# Patient Record
Sex: FEMALE | Race: WHITE | Employment: FULL TIME | URBAN - METROPOLITAN AREA
[De-identification: names, ages, dates, MRNs, and addresses within clinical notes are randomized per-mention and may not be internally consistent; named-entity substitution may affect disease eponyms.]

---

## 2017-01-11 ENCOUNTER — TELEPHONE (OUTPATIENT)
Dept: FAMILY MEDICINE CLINIC | Age: 52
End: 2017-01-11

## 2017-03-21 RX ORDER — ESTRADIOL 0.05 MG/D
PATCH TRANSDERMAL
Qty: 12 PATCH | Refills: 0 | Status: SHIPPED | OUTPATIENT
Start: 2017-03-21 | End: 2017-04-12 | Stop reason: SDUPTHER

## 2017-04-12 DIAGNOSIS — Z12.39 BREAST CANCER SCREENING: Primary | ICD-10-CM

## 2017-04-12 RX ORDER — NICOTINE POLACRILEX 4 MG
GUM BUCCAL
Qty: 30 TABLET | Refills: 5 | Status: SHIPPED | OUTPATIENT
Start: 2017-04-12

## 2017-04-12 RX ORDER — ACETAMINOPHEN 160 MG
TABLET,DISINTEGRATING ORAL
Qty: 30 CAPSULE | Refills: 5 | Status: SHIPPED | OUTPATIENT
Start: 2017-04-12

## 2017-04-12 RX ORDER — ESTRADIOL 0.05 MG/D
PATCH TRANSDERMAL
Qty: 12 PATCH | Refills: 0 | Status: SHIPPED | OUTPATIENT
Start: 2017-04-12 | End: 2018-07-30

## 2017-07-17 ENCOUNTER — TELEPHONE (OUTPATIENT)
Dept: FAMILY MEDICINE CLINIC | Age: 52
End: 2017-07-17

## 2017-08-29 ENCOUNTER — TELEPHONE (OUTPATIENT)
Dept: FAMILY MEDICINE CLINIC | Age: 52
End: 2017-08-29

## 2018-07-20 ENCOUNTER — HOSPITAL ENCOUNTER (OUTPATIENT)
Dept: WOMENS IMAGING | Age: 53
Discharge: HOME | End: 2018-07-20
Attending: FAMILY MEDICINE | Admitting: FAMILY MEDICINE

## 2018-07-20 DIAGNOSIS — Z12.31 VISIT FOR SCREENING MAMMOGRAM: ICD-10-CM

## 2018-07-30 ENCOUNTER — TELEPHONE (OUTPATIENT)
Dept: FAMILY MEDICINE CLINIC | Age: 53
End: 2018-07-30

## 2018-07-30 ENCOUNTER — OFFICE VISIT (OUTPATIENT)
Dept: FAMILY MEDICINE CLINIC | Age: 53
End: 2018-07-30

## 2018-07-30 VITALS
HEART RATE: 83 BPM | BODY MASS INDEX: 29.92 KG/M2 | WEIGHT: 179.6 LBS | OXYGEN SATURATION: 97 % | HEIGHT: 65 IN | DIASTOLIC BLOOD PRESSURE: 82 MMHG | SYSTOLIC BLOOD PRESSURE: 140 MMHG

## 2018-07-30 DIAGNOSIS — F41.9 ANXIETY: Primary | ICD-10-CM

## 2018-07-30 DIAGNOSIS — F41.9 ANXIETY: ICD-10-CM

## 2018-07-30 PROCEDURE — 99213 OFFICE O/P EST LOW 20 MIN: CPT | Performed by: FAMILY MEDICINE

## 2018-07-30 RX ORDER — LORAZEPAM 0.5 MG/1
0.5 TABLET ORAL 2 TIMES DAILY PRN
Qty: 60 TABLET | Refills: 2 | Status: SHIPPED | OUTPATIENT
Start: 2018-07-30 | End: 2018-07-31 | Stop reason: SDUPTHER

## 2018-07-30 ASSESSMENT — ENCOUNTER SYMPTOMS
WHEEZING: 0
CHEST TIGHTNESS: 0
COUGH: 0
SHORTNESS OF BREATH: 1

## 2018-07-30 ASSESSMENT — PATIENT HEALTH QUESTIONNAIRE - PHQ9
SUM OF ALL RESPONSES TO PHQ QUESTIONS 1-9: 0
2. FEELING DOWN, DEPRESSED OR HOPELESS: 0
1. LITTLE INTEREST OR PLEASURE IN DOING THINGS: 0
SUM OF ALL RESPONSES TO PHQ9 QUESTIONS 1 & 2: 0

## 2018-07-30 NOTE — PROGRESS NOTES
She appears well-developed and well-nourished. No distress. HENT:   Head: Normocephalic and atraumatic. Mouth/Throat: No oropharyngeal exudate. Cardiovascular: Normal rate, regular rhythm and normal heart sounds. No murmur heard. Pulmonary/Chest: Effort normal and breath sounds normal. She has no wheezes. Musculoskeletal: Normal range of motion. She exhibits no edema. Neurological: She is alert and oriented to person, place, and time. No cranial nerve deficit. She exhibits normal muscle tone. Coordination normal.   Skin: She is not diaphoretic. Psychiatric: She has a normal mood and affect. Her behavior is normal.   Was crying       ASSESSMENT/ PLAN:    1. Anxiety  - Start:  - LORazepam (ATIVAN) 0.5 MG tablet; Take 1 tablet by mouth 2 times daily as needed for Anxiety for up to 30 days. .  Dispense: 60 tablet; Refill: 2  - keep the time occupied                - Appropriate prescription are addressed. - After visit summery provided. - Questions answered and patient verbalizes understanding.  - Call for any problem, questions, or concerns. Return if symptoms worsen or fail to improve.

## 2018-07-31 RX ORDER — LORAZEPAM 0.5 MG/1
0.5 TABLET ORAL 2 TIMES DAILY PRN
Qty: 60 TABLET | Refills: 2 | Status: SHIPPED | OUTPATIENT
Start: 2018-07-31 | End: 2018-08-30

## 2021-06-11 ENCOUNTER — HOSPITAL ENCOUNTER (OUTPATIENT)
Dept: WOMENS IMAGING | Age: 56
Discharge: HOME | End: 2021-06-11
Payer: COMMERCIAL

## 2021-06-11 DIAGNOSIS — Z12.31 OTHER SCREENING MAMMOGRAM: ICD-10-CM

## 2021-06-11 PROCEDURE — 77067 SCR MAMMO BI INCL CAD: CPT

## 2024-01-30 ENCOUNTER — HOSPITAL ENCOUNTER (OUTPATIENT)
Dept: MAMMOGRAPHY | Age: 59
Discharge: HOME | End: 2024-01-30
Payer: COMMERCIAL

## 2024-01-30 VITALS — WEIGHT: 185 LBS | BODY MASS INDEX: 30.79 KG/M2

## 2024-01-30 DIAGNOSIS — Z12.31 SCREENING MAMMOGRAM, ENCOUNTER FOR: ICD-10-CM

## 2024-01-30 PROCEDURE — 77063 BREAST TOMOSYNTHESIS BI: CPT

## 2024-08-09 ENCOUNTER — HOSPITAL ENCOUNTER (EMERGENCY)
Age: 59
Discharge: HOME | End: 2024-08-09
Attending: EMERGENCY MEDICINE
Payer: COMMERCIAL

## 2024-08-09 ENCOUNTER — APPOINTMENT (OUTPATIENT)
Dept: GENERAL RADIOLOGY | Age: 59
End: 2024-08-09
Payer: COMMERCIAL

## 2024-08-09 VITALS
RESPIRATION RATE: 21 BRPM | WEIGHT: 185 LBS | HEIGHT: 65 IN | TEMPERATURE: 98.6 F | DIASTOLIC BLOOD PRESSURE: 74 MMHG | OXYGEN SATURATION: 96 % | HEART RATE: 66 BPM | SYSTOLIC BLOOD PRESSURE: 118 MMHG | BODY MASS INDEX: 30.82 KG/M2

## 2024-08-09 DIAGNOSIS — M25.519 SHOULDER PAIN, UNSPECIFIED CHRONICITY, UNSPECIFIED LATERALITY: Primary | ICD-10-CM

## 2024-08-09 LAB
ALBUMIN SERPL-MCNC: 4.2 GM/DL (ref 3.4–5)
ALP BLD-CCNC: 98 IU/L (ref 40–129)
ALT SERPL-CCNC: 25 U/L (ref 10–40)
ANION GAP SERPL CALCULATED.3IONS-SCNC: 13 MMOL/L (ref 7–16)
AST SERPL-CCNC: 21 IU/L (ref 15–37)
BASOPHILS ABSOLUTE: 0.1 K/CU MM
BASOPHILS RELATIVE PERCENT: 0.7 % (ref 0–1)
BILIRUB SERPL-MCNC: 0.3 MG/DL (ref 0–1)
BUN SERPL-MCNC: 16 MG/DL (ref 6–23)
CALCIUM SERPL-MCNC: 9.6 MG/DL (ref 8.3–10.6)
CHLORIDE BLD-SCNC: 107 MMOL/L (ref 99–110)
CO2: 25 MMOL/L (ref 21–32)
CREAT SERPL-MCNC: 0.6 MG/DL (ref 0.6–1.1)
DIFFERENTIAL TYPE: ABNORMAL
EOSINOPHILS ABSOLUTE: 0.2 K/CU MM
EOSINOPHILS RELATIVE PERCENT: 2.4 % (ref 0–3)
GFR, ESTIMATED: >90 ML/MIN/1.73M2
GLUCOSE SERPL-MCNC: 97 MG/DL (ref 70–99)
HCT VFR BLD CALC: 40.3 % (ref 37–47)
HEMOGLOBIN: 13.3 GM/DL (ref 12.5–16)
IMMATURE NEUTROPHIL %: 0.3 % (ref 0–0.43)
LYMPHOCYTES ABSOLUTE: 2.3 K/CU MM
LYMPHOCYTES RELATIVE PERCENT: 26.8 % (ref 24–44)
MAGNESIUM: 2.2 MG/DL (ref 1.8–2.4)
MCH RBC QN AUTO: 28.4 PG (ref 27–31)
MCHC RBC AUTO-ENTMCNC: 33 % (ref 32–36)
MCV RBC AUTO: 86.1 FL (ref 78–100)
MONOCYTES ABSOLUTE: 0.7 K/CU MM
MONOCYTES RELATIVE PERCENT: 8 % (ref 0–4)
NEUTROPHILS ABSOLUTE: 5.3 K/CU MM
NEUTROPHILS RELATIVE PERCENT: 61.8 % (ref 36–66)
PDW BLD-RTO: 13.2 % (ref 11.7–14.9)
PLATELET # BLD: 252 K/CU MM (ref 140–440)
PMV BLD AUTO: 9.7 FL (ref 7.5–11.1)
POTASSIUM SERPL-SCNC: 4.3 MMOL/L (ref 3.5–5.1)
PRO-BNP: <36 PG/ML
RBC # BLD: 4.68 M/CU MM (ref 4.2–5.4)
SODIUM BLD-SCNC: 145 MMOL/L (ref 135–145)
TOTAL CK: 39 IU/L (ref 26–140)
TOTAL IMMATURE NEUTOROPHIL: 0.03 K/CU MM
TOTAL PROTEIN: 6.5 GM/DL (ref 6.4–8.2)
TROPONIN, HIGH SENSITIVITY: <6 NG/L (ref 0–14)
WBC # BLD: 8.6 K/CU MM (ref 4–10.5)

## 2024-08-09 PROCEDURE — 83735 ASSAY OF MAGNESIUM: CPT

## 2024-08-09 PROCEDURE — 82550 ASSAY OF CK (CPK): CPT

## 2024-08-09 PROCEDURE — 80053 COMPREHEN METABOLIC PANEL: CPT

## 2024-08-09 PROCEDURE — 99285 EMERGENCY DEPT VISIT HI MDM: CPT

## 2024-08-09 PROCEDURE — 71045 X-RAY EXAM CHEST 1 VIEW: CPT

## 2024-08-09 PROCEDURE — 84484 ASSAY OF TROPONIN QUANT: CPT

## 2024-08-09 PROCEDURE — 6370000000 HC RX 637 (ALT 250 FOR IP): Performed by: EMERGENCY MEDICINE

## 2024-08-09 PROCEDURE — 85025 COMPLETE CBC W/AUTO DIFF WBC: CPT

## 2024-08-09 PROCEDURE — 6360000002 HC RX W HCPCS: Performed by: EMERGENCY MEDICINE

## 2024-08-09 PROCEDURE — 96374 THER/PROPH/DIAG INJ IV PUSH: CPT

## 2024-08-09 PROCEDURE — 73030 X-RAY EXAM OF SHOULDER: CPT

## 2024-08-09 PROCEDURE — 83880 ASSAY OF NATRIURETIC PEPTIDE: CPT

## 2024-08-09 RX ORDER — CYCLOBENZAPRINE HCL 10 MG
10 TABLET ORAL 3 TIMES DAILY PRN
Qty: 30 TABLET | Refills: 0 | Status: SHIPPED | OUTPATIENT
Start: 2024-08-09 | End: 2024-08-19

## 2024-08-09 RX ORDER — ASPIRIN 81 MG/1
324 TABLET, CHEWABLE ORAL ONCE
Status: COMPLETED | OUTPATIENT
Start: 2024-08-09 | End: 2024-08-09

## 2024-08-09 RX ORDER — LIDOCAINE 4 G/G
1 PATCH TOPICAL DAILY
Status: DISCONTINUED | OUTPATIENT
Start: 2024-08-09 | End: 2024-08-09 | Stop reason: HOSPADM

## 2024-08-09 RX ORDER — NAPROXEN 500 MG/1
500 TABLET ORAL 2 TIMES DAILY
Qty: 60 TABLET | Refills: 0 | Status: SHIPPED | OUTPATIENT
Start: 2024-08-09

## 2024-08-09 RX ORDER — CYCLOBENZAPRINE HCL 10 MG
10 TABLET ORAL ONCE
Status: COMPLETED | OUTPATIENT
Start: 2024-08-09 | End: 2024-08-09

## 2024-08-09 RX ORDER — KETOROLAC TROMETHAMINE 30 MG/ML
30 INJECTION, SOLUTION INTRAMUSCULAR; INTRAVENOUS ONCE
Status: COMPLETED | OUTPATIENT
Start: 2024-08-09 | End: 2024-08-09

## 2024-08-09 RX ORDER — ACETAMINOPHEN 325 MG/1
650 TABLET ORAL EVERY 6 HOURS PRN
Qty: 120 TABLET | Refills: 3 | Status: SHIPPED | OUTPATIENT
Start: 2024-08-09

## 2024-08-09 RX ORDER — LIDOCAINE 4 G/G
1 PATCH TOPICAL DAILY
Qty: 30 PATCH | Refills: 0 | Status: SHIPPED | OUTPATIENT
Start: 2024-08-09 | End: 2024-09-08

## 2024-08-09 RX ADMIN — ASPIRIN 324 MG: 81 TABLET, CHEWABLE ORAL at 17:33

## 2024-08-09 RX ADMIN — CYCLOBENZAPRINE 10 MG: 10 TABLET, FILM COATED ORAL at 17:34

## 2024-08-09 RX ADMIN — KETOROLAC TROMETHAMINE 30 MG: 30 INJECTION, SOLUTION INTRAMUSCULAR; INTRAVENOUS at 17:35

## 2024-08-09 ASSESSMENT — ENCOUNTER SYMPTOMS
GASTROINTESTINAL NEGATIVE: 1
ALLERGIC/IMMUNOLOGIC NEGATIVE: 1
RESPIRATORY NEGATIVE: 1
EYES NEGATIVE: 1

## 2024-08-09 ASSESSMENT — PAIN SCALES - GENERAL: PAINLEVEL_OUTOF10: 4

## 2024-08-09 ASSESSMENT — PAIN - FUNCTIONAL ASSESSMENT: PAIN_FUNCTIONAL_ASSESSMENT: 0-10

## 2024-08-09 NOTE — ED PROVIDER NOTES
effusion or laceration. Normal range of motion. Normal pulse.      Left wrist: Normal pulse.        Arms:       Cervical back: Normal range of motion. No rigidity or tenderness.      Right lower leg: No edema.      Left lower leg: No edema.   Skin:     General: Skin is warm.      Coloration: Skin is not jaundiced or pale.      Findings: No bruising, erythema, lesion or rash.   Neurological:      General: No focal deficit present.      Mental Status: She is alert and oriented to person, place, and time.      GCS: GCS eye subscore is 4. GCS verbal subscore is 5. GCS motor subscore is 6.      Cranial Nerves: Cranial nerves 2-12 are intact. No cranial nerve deficit, dysarthria or facial asymmetry.      Sensory: Sensation is intact. No sensory deficit.      Motor: Motor function is intact. No weakness, tremor, atrophy, abnormal muscle tone or seizure activity.      Coordination: Coordination normal.   Psychiatric:         Mood and Affect: Mood normal.         Behavior: Behavior normal.         Thought Content: Thought content normal.         Judgment: Judgment normal.           I have reviewed andinterpreted all of the currently available lab results from this visit (if applicable):    Results for orders placed or performed during the hospital encounter of 08/09/24   CBC with Auto Differential   Result Value Ref Range    WBC 8.6 4.0 - 10.5 K/CU MM    RBC 4.68 4.2 - 5.4 M/CU MM    Hemoglobin 13.3 12.5 - 16.0 GM/DL    Hematocrit 40.3 37 - 47 %    MCV 86.1 78 - 100 FL    MCH 28.4 27 - 31 PG    MCHC 33.0 32.0 - 36.0 %    RDW 13.2 11.7 - 14.9 %    Platelets 252 140 - 440 K/CU MM    MPV 9.7 7.5 - 11.1 FL    Differential Type AUTOMATED DIFFERENTIAL     Neutrophils % 61.8 36 - 66 %    Lymphocytes % 26.8 24 - 44 %    Monocytes % 8.0 (H) 0 - 4 %    Eosinophils % 2.4 0 - 3 %    Basophils % 0.7 0 - 1 %    Neutrophils Absolute 5.3 K/CU MM    Lymphocytes Absolute 2.3 K/CU MM    Monocytes Absolute 0.7 K/CU MM    Eosinophils Absolute

## 2024-08-10 LAB
EKG ATRIAL RATE: 70 BPM
EKG DIAGNOSIS: NORMAL
EKG P-R INTERVAL: 114 MS
EKG Q-T INTERVAL: 396 MS
EKG QRS DURATION: 74 MS
EKG QTC CALCULATION (BAZETT): 427 MS
EKG R AXIS: 8 DEGREES
EKG T AXIS: 18 DEGREES
EKG VENTRICULAR RATE: 70 BPM

## 2024-09-18 ENCOUNTER — HOSPITAL ENCOUNTER (EMERGENCY)
Age: 59
Discharge: HOME | End: 2024-09-18
Attending: EMERGENCY MEDICINE
Payer: COMMERCIAL

## 2024-09-18 VITALS
HEIGHT: 65 IN | WEIGHT: 185 LBS | TEMPERATURE: 97.9 F | HEART RATE: 85 BPM | DIASTOLIC BLOOD PRESSURE: 97 MMHG | OXYGEN SATURATION: 98 % | SYSTOLIC BLOOD PRESSURE: 149 MMHG | RESPIRATION RATE: 14 BRPM | BODY MASS INDEX: 30.82 KG/M2

## 2024-09-18 DIAGNOSIS — G89.29 CHRONIC PAIN OF RIGHT KNEE: Primary | ICD-10-CM

## 2024-09-18 DIAGNOSIS — M25.561 CHRONIC PAIN OF RIGHT KNEE: Primary | ICD-10-CM

## 2024-09-18 PROCEDURE — 99283 EMERGENCY DEPT VISIT LOW MDM: CPT

## 2024-09-18 RX ORDER — NAPROXEN 500 MG/1
500 TABLET ORAL 2 TIMES DAILY WITH MEALS
Qty: 30 TABLET | Refills: 0 | Status: SHIPPED | OUTPATIENT
Start: 2024-09-18

## 2024-09-18 ASSESSMENT — PAIN - FUNCTIONAL ASSESSMENT
PAIN_FUNCTIONAL_ASSESSMENT: 0-10
PAIN_FUNCTIONAL_ASSESSMENT: ACTIVITIES ARE NOT PREVENTED

## 2024-09-18 ASSESSMENT — PAIN DESCRIPTION - LOCATION: LOCATION: KNEE

## 2024-09-18 ASSESSMENT — PAIN DESCRIPTION - ORIENTATION: ORIENTATION: RIGHT

## 2024-09-18 ASSESSMENT — PAIN SCALES - GENERAL: PAINLEVEL_OUTOF10: 5

## 2024-09-18 ASSESSMENT — PAIN DESCRIPTION - DESCRIPTORS: DESCRIPTORS: ACHING

## 2025-01-30 ENCOUNTER — HOSPITAL ENCOUNTER (EMERGENCY)
Age: 60
Discharge: HOME | End: 2025-01-30
Attending: EMERGENCY MEDICINE
Payer: COMMERCIAL

## 2025-01-30 ENCOUNTER — APPOINTMENT (OUTPATIENT)
Dept: GENERAL RADIOLOGY | Age: 60
End: 2025-01-30
Payer: COMMERCIAL

## 2025-01-30 VITALS
HEIGHT: 65 IN | RESPIRATION RATE: 17 BRPM | OXYGEN SATURATION: 100 % | HEART RATE: 73 BPM | SYSTOLIC BLOOD PRESSURE: 134 MMHG | TEMPERATURE: 97.9 F | DIASTOLIC BLOOD PRESSURE: 65 MMHG | BODY MASS INDEX: 29.99 KG/M2 | WEIGHT: 180 LBS

## 2025-01-30 DIAGNOSIS — M25.511 ACUTE PAIN OF RIGHT SHOULDER: Primary | ICD-10-CM

## 2025-01-30 PROCEDURE — 99284 EMERGENCY DEPT VISIT MOD MDM: CPT

## 2025-01-30 PROCEDURE — 6370000000 HC RX 637 (ALT 250 FOR IP): Performed by: EMERGENCY MEDICINE

## 2025-01-30 PROCEDURE — 73030 X-RAY EXAM OF SHOULDER: CPT

## 2025-01-30 PROCEDURE — 6360000002 HC RX W HCPCS: Performed by: EMERGENCY MEDICINE

## 2025-01-30 PROCEDURE — 93005 ELECTROCARDIOGRAM TRACING: CPT | Performed by: EMERGENCY MEDICINE

## 2025-01-30 RX ORDER — LIDOCAINE 50 MG/G
1 PATCH TOPICAL DAILY
Qty: 10 PATCH | Refills: 0 | Status: SHIPPED | OUTPATIENT
Start: 2025-01-30 | End: 2025-02-09

## 2025-01-30 RX ORDER — IBUPROFEN 400 MG/1
600 TABLET, FILM COATED ORAL ONCE
Status: COMPLETED | OUTPATIENT
Start: 2025-01-30 | End: 2025-01-30

## 2025-01-30 RX ORDER — ACETAMINOPHEN 325 MG/1
650 TABLET ORAL ONCE
Status: COMPLETED | OUTPATIENT
Start: 2025-01-30 | End: 2025-01-30

## 2025-01-30 RX ORDER — METHOCARBAMOL 500 MG/1
500 TABLET, FILM COATED ORAL ONCE
Status: COMPLETED | OUTPATIENT
Start: 2025-01-30 | End: 2025-01-30

## 2025-01-30 RX ORDER — DEXAMETHASONE 4 MG/1
6 TABLET ORAL ONCE
Status: COMPLETED | OUTPATIENT
Start: 2025-01-30 | End: 2025-01-30

## 2025-01-30 RX ORDER — METHOCARBAMOL 500 MG/1
500 TABLET, FILM COATED ORAL 3 TIMES DAILY
Qty: 30 TABLET | Refills: 0 | Status: SHIPPED | OUTPATIENT
Start: 2025-01-30 | End: 2025-02-09

## 2025-01-30 RX ORDER — PREDNISONE 50 MG/1
50 TABLET ORAL DAILY
Qty: 5 TABLET | Refills: 0 | Status: SHIPPED | OUTPATIENT
Start: 2025-01-30 | End: 2025-02-04

## 2025-01-30 RX ADMIN — DEXAMETHASONE 6 MG: 4 TABLET ORAL at 17:55

## 2025-01-30 RX ADMIN — ACETAMINOPHEN 650 MG: 325 TABLET ORAL at 17:55

## 2025-01-30 RX ADMIN — METHOCARBAMOL 500 MG: 500 TABLET ORAL at 17:55

## 2025-01-30 RX ADMIN — IBUPROFEN 600 MG: 400 TABLET, FILM COATED ORAL at 17:55

## 2025-01-30 ASSESSMENT — PAIN SCALES - GENERAL: PAINLEVEL_OUTOF10: 6

## 2025-01-30 ASSESSMENT — LIFESTYLE VARIABLES
HOW MANY STANDARD DRINKS CONTAINING ALCOHOL DO YOU HAVE ON A TYPICAL DAY: PATIENT DOES NOT DRINK
HOW OFTEN DO YOU HAVE A DRINK CONTAINING ALCOHOL: NEVER

## 2025-01-30 ASSESSMENT — PAIN DESCRIPTION - ORIENTATION: ORIENTATION: RIGHT

## 2025-01-30 ASSESSMENT — PAIN DESCRIPTION - LOCATION: LOCATION: SHOULDER

## 2025-01-30 NOTE — DISCHARGE INSTRUCTIONS
Follow-up with the orthopedist Dr. Carrillo for evaluation of right shoulder pain.  Call for an appointment  Take Robaxin muscle relaxant as prescribed and directed.  No drink or drive while taking  Take prednisone 50 mg p.o. daily for inflammation pain and swelling  Apply Lidoderm pain patch to affected area twice a day  Take Tylenol alternating Motrin for any pain aches and fevers  Return to the emergency department immediately pain fever chills nausea vomiting dizzy lightheaded any worsening symptoms.

## 2025-01-30 NOTE — ED PROVIDER NOTES
obtained. There is no acute fracture or dislocation of the right shoulder. Acromioclavicular and glenohumeral joint grossly intact. Mild degenerative spurring. IMPRESSION: 1. No acute osseous abnormality right shoulder.   Dictated and Electronically Signed By: Braden Smallwood MD 1/30/2025 17:33              MDM:  CC/HPI Summary, DDx, ED Course, and Reassessment:   Krissy Lee is a 59 y.o. female with history of fibromyalgia, chronic back pain that presents to the emergency department complaining of right shoulder pain.  Patient states no falls injury or trauma.  She states no heavy lifting pulling or straining.  She states no previous injury or surgery to the right shoulder.  She states muscle pain and spasm.  States she has been take some naproxen last dose 11 this morning.  She denies any chest pain shortness of breath no chest pressure no fever chills cough abdominal pain no numbness and tingling weakness in extremities.      On physical examRight shoulder right trapezius muscle tenderness and tightness to palpation, full range of motion of the right shoulder no fracture dislocation, intact right radial pulse, 5/5  strength, full range of motion at the right shoulder right wrist, no swelling no deformity of the right shoulder or upper extremity, no swelling.  Normal ROM    Differential diagnose include right shoulder sprain strain right shoulder dislocation    Patient was ordered right shoulder x-ray    Right shoulder x-ray per my interpretation no obvious fracture or dislocation.  Per radiology degenerative spurring.  Patient updated on these findings.  Patient was given Decadron 6 mg p.o., Tylenol 650 mg p.o., ibuprofen 600 mg p.o., Robaxin 500 mg p.o.  Patient states she did want a EKG chest to evaluate.  Patient is denying chest pain pressure heaviness or tightness she states she just wants to make sure since women can present differently.  Discussed with patient her pain is muscle skeletal there is

## 2025-01-30 NOTE — ED TRIAGE NOTES
Pt arrived via triage with c/o right shoulder pain. Pt reports she believes she slept wrong 2 night ago. Pt reports she had a massage yesterday to attempt to help, she reports relief for 2 hours. Pt has taken naproxen without relief. Pt denies injury or trauma.    PAIN SCALE 4 OF 10.

## 2025-01-31 LAB
EKG ATRIAL RATE: 64 BPM
EKG DIAGNOSIS: NORMAL
EKG P AXIS: 43 DEGREES
EKG P-R INTERVAL: 140 MS
EKG Q-T INTERVAL: 422 MS
EKG QRS DURATION: 82 MS
EKG QTC CALCULATION (BAZETT): 435 MS
EKG R AXIS: 12 DEGREES
EKG T AXIS: 17 DEGREES
EKG VENTRICULAR RATE: 64 BPM

## 2025-01-31 PROCEDURE — 93010 ELECTROCARDIOGRAM REPORT: CPT | Performed by: INTERNAL MEDICINE

## 2025-02-10 SDOH — HEALTH STABILITY: PHYSICAL HEALTH: ON AVERAGE, HOW MANY DAYS PER WEEK DO YOU ENGAGE IN MODERATE TO STRENUOUS EXERCISE (LIKE A BRISK WALK)?: 5 DAYS

## 2025-02-10 SDOH — HEALTH STABILITY: PHYSICAL HEALTH: ON AVERAGE, HOW MANY MINUTES DO YOU ENGAGE IN EXERCISE AT THIS LEVEL?: 30 MIN

## 2025-02-11 ENCOUNTER — OFFICE VISIT (OUTPATIENT)
Dept: ORTHOPEDIC SURGERY | Age: 60
End: 2025-02-11
Payer: COMMERCIAL

## 2025-02-11 VITALS — BODY MASS INDEX: 29.95 KG/M2 | RESPIRATION RATE: 12 BRPM | HEIGHT: 65 IN | OXYGEN SATURATION: 98 % | HEART RATE: 84 BPM

## 2025-02-11 DIAGNOSIS — M47.812 CERVICAL SPONDYLOSIS: Primary | ICD-10-CM

## 2025-02-11 DIAGNOSIS — M54.10 RADICULOPATHY AFFECTING UPPER EXTREMITY: ICD-10-CM

## 2025-02-11 PROCEDURE — G8427 DOCREV CUR MEDS BY ELIG CLIN: HCPCS | Performed by: ORTHOPAEDIC SURGERY

## 2025-02-11 PROCEDURE — 3017F COLORECTAL CA SCREEN DOC REV: CPT | Performed by: ORTHOPAEDIC SURGERY

## 2025-02-11 PROCEDURE — 1036F TOBACCO NON-USER: CPT | Performed by: ORTHOPAEDIC SURGERY

## 2025-02-11 PROCEDURE — G8419 CALC BMI OUT NRM PARAM NOF/U: HCPCS | Performed by: ORTHOPAEDIC SURGERY

## 2025-02-11 PROCEDURE — 99203 OFFICE O/P NEW LOW 30 MIN: CPT | Performed by: ORTHOPAEDIC SURGERY

## 2025-02-11 RX ORDER — METHYLPREDNISOLONE 4 MG/1
TABLET ORAL
COMMUNITY
Start: 2024-03-27

## 2025-02-11 RX ORDER — KETOROLAC TROMETHAMINE 10 MG/1
10 TABLET, FILM COATED ORAL EVERY 6 HOURS PRN
COMMUNITY
Start: 2025-02-04

## 2025-02-11 NOTE — PATIENT INSTRUCTIONS
Continue weight-bearing as tolerated.  Continue range of motion exercises as instructed.  Ice and elevate as needed.  Tylenol or Motrin for pain.  Out patient Physical Therapy has been ordered by your provider. [facility] will call you to set up therapy. If you have not heard from them within 24-48 hours of today's appointment, please reach out to them at [phone].     Follow up as needed.

## 2025-02-11 NOTE — PROGRESS NOTES
Patient presents to the office with right shoulder pain. Pt stated that she noticed on 1/30/25 that she had increased pain in her shoulder on the posterior aspect and radiating down her arm. Pt stated she has full range of motion but pain all the way to the finger tips. She noticed her second and third digits are completely numb. Pt stated there was no known injury but felt she may of slept wrong.

## 2025-02-14 PROBLEM — M54.10 RADICULOPATHY AFFECTING UPPER EXTREMITY: Status: ACTIVE | Noted: 2025-02-14

## 2025-02-14 PROBLEM — M47.812 CERVICAL SPONDYLOSIS: Status: ACTIVE | Noted: 2025-02-14

## 2025-02-14 ASSESSMENT — ENCOUNTER SYMPTOMS
SHORTNESS OF BREATH: 0
CHEST TIGHTNESS: 0
EYE REDNESS: 0
EYE PAIN: 0
COLOR CHANGE: 0
WHEEZING: 0
VOMITING: 0

## 2025-02-14 NOTE — PROGRESS NOTES
2/11/2025   Chief Complaint   Patient presents with    Shoulder Pain     Right         History of Present Illness:                             Krissy Lee is a 59 y.o. female who presents today for evaluation of her neck and radiating right shoulder pain.    Patient was seen in the emergency room on 2/5/2025 and 2 for 2025.  She has seen the emergency room 3 times regarding this issue.  She has received medications for anti-inflammatories and muscle relaxers.    Patient has history of chronic back pain and fibromyalgia.      Symptoms have been getting worse over the last 2 months.  She does not recall any specific injuries or falls.  Pain is worse with lifting pushing and overhead reaching.  There are certain positional movements that she can make with her neck or shoulder that relieved the pain      Patient presents to the office with right shoulder pain. Pt stated that she noticed on 1/30/25 that she had increased pain in her shoulder on the posterior aspect and radiating down her arm. Pt stated she has full range of motion but pain all the way to the finger tips. She noticed her second and third digits are completely numb. Pt stated there was no known injury but felt she may of slept wrong.            Medical History  Patient's medications, allergies, past medical, surgical, social and family histories were reviewed and updated as appropriate.    Past Medical History:   Diagnosis Date    Chronic back pain     Fibromyalgia      Past Surgical History:   Procedure Laterality Date    HYSTERECTOMY (CERVIX STATUS UNKNOWN)      KNEE SURGERY      OVARY REMOVAL       Family History   Problem Relation Age of Onset    Arthritis Mother     High Blood Pressure Mother     Cancer Brother      Social History     Socioeconomic History    Marital status:      Spouse name: None    Number of children: None    Years of education: None    Highest education level: None   Tobacco Use    Smoking status: Never

## 2025-02-18 ENCOUNTER — HOSPITAL ENCOUNTER (OUTPATIENT)
Dept: PHYSICAL THERAPY | Age: 60
Setting detail: THERAPIES SERIES
Discharge: HOME | End: 2025-02-18
Attending: ORTHOPAEDIC SURGERY
Payer: COMMERCIAL

## 2025-02-18 PROCEDURE — 97110 THERAPEUTIC EXERCISES: CPT

## 2025-02-18 PROCEDURE — 97161 PT EVAL LOW COMPLEX 20 MIN: CPT

## 2025-02-18 NOTE — PROGRESS NOTES
Physical Therapy: Initial Evaluation    Patient: Krissy Lee (60 y.o. female)   Examination Date: 2025  Plan of Care Certification Period: 2025 to        :  1965 ;    Confirmed: Yes MRN: [id number]  CSN: [id number]   Insurance: Payor: [employer] / Plan: [employer] - CHOICE PLUS / Product Type: *No Product type* /   Insurance ID: [id number] - (Commercial) Secondary Insurance (if applicable):    Referring Physician: Silvano Carrillo MD Dr. Zartman   PCP: No, Pcp Visits to Date/Visits Approved:   /      No Show/Cancelled Appts:   /       Medical Diagnosis: Cervical spondylosis [M47.812]  Radiculopathy affecting upper extremity [M54.10] cervical radiculopathy  Treatment Diagnosis: Decreased cervical ROM, UE strength, and impaired sensation     PERTINENT MEDICAL HISTORY   Patient Assessed for Rehabilitation Services: Yes       Medical History: Chart Reviewed: Yes   Past Medical History:   Diagnosis Date    Chronic back pain     Fibromyalgia      Surgical History:   Past Surgical History:   Procedure Laterality Date    HYSTERECTOMY (CERVIX STATUS UNKNOWN)      KNEE SURGERY      OVARY REMOVAL         Medications:   Current Outpatient Medications:     methylPREDNISolone (MEDROL DOSEPACK) 4 MG tablet, methylprednisolone 4 mg tablets in a dose pack FOLLOW PACKAGE DIRECTIONS Mar- - Active (Patient not taking: Reported on 2025), Disp: , Rfl:     ketorolac (TORADOL) 10 MG tablet, Take 1 tablet by mouth every 6 hours as needed for Pain, Disp: , Rfl:     naproxen (NAPROSYN) 500 MG tablet, Take 1 tablet by mouth 2 times daily (Patient not taking: Reported on 2024), Disp: 60 tablet, Rfl: 0    acetaminophen (TYLENOL) 325 MG tablet, Take 2 tablets by mouth every 6 hours as needed for Pain, Disp: 120 tablet, Rfl: 3    acetaminophen (TYLENOL) 325 MG tablet, Take 2 tablets by mouth every 6 hours as needed for Pain, Disp: 120 tablet, Rfl: 3  Allergies: Duloxetine hcl and Tramadol hcl

## 2025-02-18 NOTE — PLAN OF CARE
Outpatient Physical Therapy           [place]           [] Phone: [phone]   Fax: [phone]  [place]           [] Phone: [phone]   Fax: [phone]     To: Silvano Carrillo MD Dr. Zartman   From: Marlena Carcamo PT, PT     Patient: Krissy Lee       : 1965  Diagnosis: Cervical spondylosis [M47.812]  Radiculopathy affecting upper extremity [M54.10] Diagnosis: cervical radiculopathy  Treatment Diagnosis: Decreased cervical ROM, UE strength, and impaired sensation  Date: 2025    Physical Therapy Certification/Re-Certification Form  Dear Dr. Carrillo,   The following patient has been evaluated for physical therapy services and for therapy to continue, insurance requires physician review of the treatment plan initially and every 90 days. Please review the attached evaluation and/or summary of the patient's plan of care, and verify that you agree therapy should continue by signing the attached document and sending it back to our office.    Assessment:    Assessment: Patient is a 58 y/o female who arrives to therapy with c/o increased right upper extremity pain approx. 3 weeks ago without injury. The pain is described as shooting pain along the shoulder/shoulder blade that radiates down the arm with tingling in digits 1-3. She had been seeing a chiropractor for 5 visits with little improvements in her symptoms. No imaging has been completed of her cervical spine, x-ray of shoulder displayed no abnormalities. This has affected her day to day life, working as a , and sleeping due to intense and severe pain. She did go to the emergency room twice due to the pain. Upon assessment, patient displayed improvements in symptoms with cervical extension and left side bending, worsening of symptoms with cervical flexion. She displayed positive cervical radiculopathy testing on the right side. No significant shoulder pathologies displayed today. Patient had good short term relief of symptoms

## 2025-02-18 NOTE — FLOWSHEET NOTE
assessment, patient displayed improvements in symptoms with cervical extension and left side bending, worsening of symptoms with cervical flexion. She displayed positive cervical radiculopathy testing on the right side. No significant shoulder pathologies displayed today. Patient had good short term relief of symptoms with cervical distraction. Minimal restrictions in cervical joint mobilizations with pain along C5-C7. Patient would benefit from PT intervention to address these deficits and return to PLOF.        Subjective:  See eval         Any changes in Ambulatory Summary Sheet?  None        Objective:  See eval           Exercises: (No more than 4 columns)   Exercise/Equipment 2/18/25 #1 Date Date           WARM UP                     TABLE      *Upper Trap Stretch (L side only)      *Cervical Extension SNAG      *Seated Thoracic Extension over chair      *Seated Scapular Retraction                            STANDING                                                     PROPRIOCEPTION                                    MODALITIES                      Other Therapeutic Activities/Education:  Patient received education on their current pathology and how their condition effects them with their functional activities. Patient understood discussion and questions were answered. Patient understands their activity limitations and understands rational for treatment progression.       Home Exercise Program:  *HO issued, reviewed and discussed with patient. All questions answered. Pt agreed to comply.    Access Code: [email or web address]  URL: [email or web address]  Date: 02/18/2025  Prepared by: Marlena Carcamo    Exercises  - Seated Cervical Sidebending Stretch  - 1 x daily - 7 x weekly - 1 sets - 5 reps - 30 hold  - Cervical Extension AROM with Strap  - 1 x daily - 7 x weekly - 2 sets - 10 reps - 3 hold  - Seated Thoracic Lumbar Extension  - 1 x daily - 7 x weekly - 2 sets - 10 reps - 3 hold  - Seated Scapular Retraction  - 1 x

## 2025-02-20 ENCOUNTER — HOSPITAL ENCOUNTER (OUTPATIENT)
Dept: PHYSICAL THERAPY | Age: 60
Setting detail: THERAPIES SERIES
Discharge: HOME | End: 2025-02-20
Attending: ORTHOPAEDIC SURGERY
Payer: COMMERCIAL

## 2025-02-20 PROCEDURE — 97140 MANUAL THERAPY 1/> REGIONS: CPT

## 2025-02-20 PROCEDURE — 97110 THERAPEUTIC EXERCISES: CPT

## 2025-02-20 NOTE — FLOWSHEET NOTE
Outpatient Physical Therapy  [place]           [x] Phone: [phone]   Fax: [phone]  [place]           [] Phone: [phone]   Fax: [phone]        Physical Therapy Daily Treatment Note  Date:  2025    Patient Name:  Krissy Lee    :  1965  MRN: [id number]  Restrictions/Precautions: NONE  Diagnosis:   Cervical spondylosis [M47.812]  Radiculopathy affecting upper extremity [M54.10] Diagnosis: cervical radiculopathy  Date of Injury/Surgery: --  Treatment Diagnosis:  Decreased cervical ROM, UE strength, and impaired sensation  Insurance/Certification information: [facility]  Referring Physician:  Silvano Carrillo MD Dr. Zartman   PCP: Neto Marti MD  Next Doctor Visit:  --  Plan of care signed (Y/N):  N, sent 25  Outcome Measure: NDI:   Visit# / total visits:   2/10  Pain level: 3/10   Goals:     Patient goals: improve pain in her neck  Short term goals  Time Frame for Short term goals: 5 weeks  Pt demo I w/ HEP and symptom management  Pt demo NDI score <30% disability to improve tolerance to ADL's and work duties  Pt reports 25% improvement in symptoms since starting therapy  Pt demo >4/5 BUE strength in all directions without increased pain or symptoms  Pt demo WNL cervical flexion without increased pain or symptoms    Summary of Evaluation:  Assessment: Patient is a 58 y/o female who arrives to therapy with c/o increased right upper extremity pain approx. 3 weeks ago without injury. The pain is described as shooting pain along the shoulder/shoulder blade that radiates down the arm with tingling in digits 1-3. She had been seeing a chiropractor for 5 visits with little improvements in her symptoms. No imaging has been completed of her cervical spine, x-ray of shoulder displayed no abnormalities. This has affected her day to day life, working as a , and sleeping due to intense and severe pain. She did go to the emergency room twice due to the pain. Upon

## 2025-02-24 ENCOUNTER — HOSPITAL ENCOUNTER (OUTPATIENT)
Dept: PHYSICAL THERAPY | Age: 60
Setting detail: THERAPIES SERIES
Discharge: HOME | End: 2025-02-24
Attending: ORTHOPAEDIC SURGERY
Payer: COMMERCIAL

## 2025-02-24 PROCEDURE — 97140 MANUAL THERAPY 1/> REGIONS: CPT

## 2025-02-24 NOTE — FLOWSHEET NOTE
Outpatient Physical Therapy  [place]           [x] Phone: [phone]   Fax: [phone]  [place]           [] Phone: [phone]   Fax: [phone]        Physical Therapy Daily Treatment Note  Date:  2025    Patient Name:  Krissy Lee    :  1965  MRN: [id number]  Restrictions/Precautions: NONE  Diagnosis:   Cervical spondylosis [M47.812]  Radiculopathy affecting upper extremity [M54.10] Diagnosis: cervical radiculopathy  Date of Injury/Surgery: --  Treatment Diagnosis:  Decreased cervical ROM, UE strength, and impaired sensation  Insurance/Certification information: [facility]  Referring Physician:  Silvano Carrillo MD Dr. Zartman   PCP: Neto Marti MD  Next Doctor Visit:  --  Plan of care signed (Y/N):  N, sent 25  Outcome Measure: NDI:   Visit# / total visits:   3/10  Pain level: 3/10   Goals:     Patient goals: improve pain in her neck  Short term goals  Time Frame for Short term goals: 5 weeks  Pt demo I w/ HEP and symptom management  Pt demo NDI score <30% disability to improve tolerance to ADL's and work duties  Pt reports 25% improvement in symptoms since starting therapy  Pt demo >4/5 BUE strength in all directions without increased pain or symptoms  Pt demo WNL cervical flexion without increased pain or symptoms    Summary of Evaluation:  Assessment: Patient is a 58 y/o female who arrives to therapy with c/o increased right upper extremity pain approx. 3 weeks ago without injury. The pain is described as shooting pain along the shoulder/shoulder blade that radiates down the arm with tingling in digits 1-3. She had been seeing a chiropractor for 5 visits with little improvements in her symptoms. No imaging has been completed of her cervical spine, x-ray of shoulder displayed no abnormalities. This has affected her day to day life, working as a , and sleeping due to intense and severe pain. She did go to the emergency room twice due to the pain. Upon

## 2025-02-26 ENCOUNTER — HOSPITAL ENCOUNTER (OUTPATIENT)
Dept: PHYSICAL THERAPY | Age: 60
Setting detail: THERAPIES SERIES
Discharge: HOME | End: 2025-02-26
Attending: ORTHOPAEDIC SURGERY
Payer: COMMERCIAL

## 2025-02-26 PROCEDURE — 97110 THERAPEUTIC EXERCISES: CPT

## 2025-02-26 PROCEDURE — 97140 MANUAL THERAPY 1/> REGIONS: CPT

## 2025-02-26 NOTE — FLOWSHEET NOTE
Outpatient Physical Therapy  [place]           [x] Phone: [phone]   Fax: [phone]  [place]           [] Phone: [phone]   Fax: [phone]        Physical Therapy Daily Treatment Note  Date:  2025    Patient Name:  Krissy Lee    :  1965  MRN: [id number]  Restrictions/Precautions: NONE  Diagnosis:   Cervical spondylosis [M47.812]  Radiculopathy affecting upper extremity [M54.10] Diagnosis: cervical radiculopathy  Date of Injury/Surgery: --  Treatment Diagnosis:  Decreased cervical ROM, UE strength, and impaired sensation  Insurance/Certification information: [facility]  Referring Physician:  Silvano Carrillo MD Dr. Zartman   PCP: Neto Marti MD  Next Doctor Visit:  --  Plan of care signed (Y/N):  N, sent 25  Outcome Measure: NDI:   Visit# / total visits:   4/10  Pain level: 3/10   Goals:     Patient goals: improve pain in her neck  Short term goals  Time Frame for Short term goals: 5 weeks  Pt demo I w/ HEP and symptom management  Pt demo NDI score <30% disability to improve tolerance to ADL's and work duties  Pt reports 25% improvement in symptoms since starting therapy  Pt demo >4/5 BUE strength in all directions without increased pain or symptoms  Pt demo WNL cervical flexion without increased pain or symptoms    Summary of Evaluation:  Assessment: Patient is a 58 y/o female who arrives to therapy with c/o increased right upper extremity pain approx. 3 weeks ago without injury. The pain is described as shooting pain along the shoulder/shoulder blade that radiates down the arm with tingling in digits 1-3. She had been seeing a chiropractor for 5 visits with little improvements in her symptoms. No imaging has been completed of her cervical spine, x-ray of shoulder displayed no abnormalities. This has affected her day to day life, working as a , and sleeping due to intense and severe pain. She did go to the emergency room twice due to the pain. Upon

## 2025-03-03 ENCOUNTER — HOSPITAL ENCOUNTER (OUTPATIENT)
Dept: PHYSICAL THERAPY | Age: 60
Setting detail: THERAPIES SERIES
Discharge: HOME | End: 2025-03-03
Attending: ORTHOPAEDIC SURGERY
Payer: COMMERCIAL

## 2025-03-03 PROCEDURE — 97110 THERAPEUTIC EXERCISES: CPT

## 2025-03-03 PROCEDURE — 97140 MANUAL THERAPY 1/> REGIONS: CPT

## 2025-03-03 NOTE — FLOWSHEET NOTE
on the right side. No significant shoulder pathologies displayed today. Patient had good short term relief of symptoms with cervical distraction. Minimal restrictions in cervical joint mobilizations with pain along C5-C7. Patient would benefit from PT intervention to address these deficits and return to PLOF.      Plan for Next Session: --       Time In / Time Out:   1624/1704      Timed Code/Total Treatment Minutes:  40/40, (2) MT  (1) TE    Next Progress Note due:  10th visit      Plan of Care Interventions:  [x] Therapeutic Exercise  [] Modalities:  [x] Therapeutic Activity     [] Ultrasound  [] Estim  [] Gait Training      [x] Cervical Traction [] Lumbar Traction  [x] Neuromuscular Re-education    [] Cold/hotpack [] Iontophoresis   [x] Instruction in HEP      [] Vasopneumatic   [] Dry Needling    [x] Manual Therapy               [] Aquatic Therapy              Electronically signed by:  Mary Mallory PTA  3/3/2025, 4:24 PM

## 2025-03-05 ENCOUNTER — HOSPITAL ENCOUNTER (OUTPATIENT)
Dept: PHYSICAL THERAPY | Age: 60
Setting detail: THERAPIES SERIES
Discharge: HOME | End: 2025-03-05
Attending: ORTHOPAEDIC SURGERY
Payer: COMMERCIAL

## 2025-03-05 PROCEDURE — 97140 MANUAL THERAPY 1/> REGIONS: CPT

## 2025-03-05 PROCEDURE — 97110 THERAPEUTIC EXERCISES: CPT

## 2025-03-05 NOTE — FLOWSHEET NOTE
Outpatient Physical Therapy  [place]           [x] Phone: [phone]   Fax: [phone]  [place]           [] Phone: [phone]   Fax: [phone]        Physical Therapy Daily Treatment Note  Date:  3/5/2025    Patient Name:  Krissy Lee    :  1965  MRN: [id number]  Restrictions/Precautions: NONE  Diagnosis:   Cervical spondylosis [M47.812]  Radiculopathy affecting upper extremity [M54.10] Diagnosis: cervical radiculopathy  Date of Injury/Surgery: --  Treatment Diagnosis:  Decreased cervical ROM, UE strength, and impaired sensation  Insurance/Certification information: [facility]  Referring Physician:  Silvano Carrillo MD Dr. Zartman   PCP: Neto Marti MD  Next Doctor Visit:  --  Plan of care signed (Y/N):  N, sent 25  Outcome Measure: NDI:   Visit# / total visits:   6/10  Pain level: 0/10 pain, 1-2/10 \"soreness\"   Goals:     Patient goals: improve pain in her neck  Short term goals  Time Frame for Short term goals: 5 weeks  Pt demo I w/ HEP and symptom management  Pt demo NDI score <30% disability to improve tolerance to ADL's and work duties  Pt reports 25% improvement in symptoms since starting therapy  Pt demo >4/5 BUE strength in all directions without increased pain or symptoms  Pt demo WNL cervical flexion without increased pain or symptoms    Summary of Evaluation:  Assessment: Patient is a 58 y/o female who arrives to therapy with c/o increased right upper extremity pain approx. 3 weeks ago without injury. The pain is described as shooting pain along the shoulder/shoulder blade that radiates down the arm with tingling in digits 1-3. She had been seeing a chiropractor for 5 visits with little improvements in her symptoms. No imaging has been completed of her cervical spine, x-ray of shoulder displayed no abnormalities. This has affected her day to day life, working as a , and sleeping due to intense and severe pain. She did go to the emergency room

## 2025-03-10 ENCOUNTER — HOSPITAL ENCOUNTER (OUTPATIENT)
Dept: PHYSICAL THERAPY | Age: 60
Setting detail: THERAPIES SERIES
Discharge: HOME | End: 2025-03-10
Attending: ORTHOPAEDIC SURGERY
Payer: COMMERCIAL

## 2025-03-10 PROCEDURE — 97140 MANUAL THERAPY 1/> REGIONS: CPT

## 2025-03-10 PROCEDURE — 97110 THERAPEUTIC EXERCISES: CPT

## 2025-03-10 NOTE — FLOWSHEET NOTE
Upon assessment, patient displayed improvements in symptoms with cervical extension and left side bending, worsening of symptoms with cervical flexion. She displayed positive cervical radiculopathy testing on the right side. No significant shoulder pathologies displayed today. Patient had good short term relief of symptoms with cervical distraction. Minimal restrictions in cervical joint mobilizations with pain along C5-C7. Patient would benefit from PT intervention to address these deficits and return to PLOF.    Subjective:  Pt reports she has not had any pain since last Wed/Thursday. Still having sensation changes in her index finger, but has slightly improved following her sessions, say 50%.     Any changes in Ambulatory Summary Sheet?  None    Objective:  increased upper trap compensation with rows and HABD, able to correct with tactile cues  Weakness with front/lateral raises, required more frequent rest breaks     Exercises: (No more than 4 columns)   Exercise/Equipment 3/5/25 #6 3/10/25 #7          WARM UP     UBE             TABLE     *Upper Trap Stretch (L side only)     *Cervical Extension SNAG     *Seated Thoracic Extension over chair     *Seated Scapular Retraction      Upper back stretch     Supine Chin Tuck  2x10 3\"  towel roll 2x10 3\" with towel standing    Lateral/Front Raise 2x10 3# bilat   Seated TB HABD 2x10 GTB 2x10 GTB   Seated Taffy Pull  2x10 YTB   STANDING     Sport arc Row  33# 2x10   Sport arc Shoulder extension  22# 2x10   Pectoralis Stretch  NEXT          Other Therapeutic Activities/Education:  Patient received education on their current pathology and how their condition effects them with their functional activities. Patient understood discussion and questions were answered. Patient understands their activity limitations and understands rational for treatment progression.     Home Exercise Program:  *HO issued, reviewed and discussed with patient. All questions answered. Pt agreed to

## 2025-03-12 ENCOUNTER — HOSPITAL ENCOUNTER (OUTPATIENT)
Dept: PHYSICAL THERAPY | Age: 60
Setting detail: THERAPIES SERIES
Discharge: HOME | End: 2025-03-12
Attending: ORTHOPAEDIC SURGERY
Payer: COMMERCIAL

## 2025-03-12 NOTE — FLOWSHEET NOTE
Physical Therapy  Cancellation/No-show Note  Patient Name:  Krissy Lee  :  1965   Date:  3/12/2025  Cancelled visits to date: 1  No-shows to date: 0    For today's appointment patient:  [x]  Cancelled  []  Rescheduled appointment  []  No-show     Reason given by patient:  [x]  Patient ill  []  Conflicting appointment  []  No transportation    []  Conflict with work  []  No reason given  []  Other:     Comments:      Electronically signed by:  Marlena Carcamo PT,

## 2025-03-24 ENCOUNTER — OFFICE VISIT (OUTPATIENT)
Dept: FAMILY MEDICINE CLINIC | Age: 60
End: 2025-03-24
Payer: COMMERCIAL

## 2025-03-24 ENCOUNTER — HOSPITAL ENCOUNTER (OUTPATIENT)
Dept: PHYSICAL THERAPY | Age: 60
Setting detail: THERAPIES SERIES
Discharge: HOME | End: 2025-03-24
Attending: ORTHOPAEDIC SURGERY
Payer: COMMERCIAL

## 2025-03-24 VITALS
OXYGEN SATURATION: 98 % | SYSTOLIC BLOOD PRESSURE: 132 MMHG | HEART RATE: 70 BPM | RESPIRATION RATE: 18 BRPM | BODY MASS INDEX: 30.52 KG/M2 | DIASTOLIC BLOOD PRESSURE: 74 MMHG | HEIGHT: 64 IN | TEMPERATURE: 97 F | WEIGHT: 178.8 LBS

## 2025-03-24 DIAGNOSIS — E78.5 HYPERLIPIDEMIA, UNSPECIFIED HYPERLIPIDEMIA TYPE: ICD-10-CM

## 2025-03-24 DIAGNOSIS — F41.9 ANXIETY: ICD-10-CM

## 2025-03-24 DIAGNOSIS — Z76.89 ENCOUNTER TO ESTABLISH CARE: Primary | ICD-10-CM

## 2025-03-24 DIAGNOSIS — B02.9 HERPES ZOSTER WITHOUT COMPLICATION: ICD-10-CM

## 2025-03-24 PROCEDURE — 3017F COLORECTAL CA SCREEN DOC REV: CPT | Performed by: NURSE PRACTITIONER

## 2025-03-24 PROCEDURE — 97110 THERAPEUTIC EXERCISES: CPT

## 2025-03-24 PROCEDURE — G8427 DOCREV CUR MEDS BY ELIG CLIN: HCPCS | Performed by: NURSE PRACTITIONER

## 2025-03-24 PROCEDURE — 99214 OFFICE O/P EST MOD 30 MIN: CPT | Performed by: NURSE PRACTITIONER

## 2025-03-24 PROCEDURE — 97140 MANUAL THERAPY 1/> REGIONS: CPT

## 2025-03-24 PROCEDURE — 1036F TOBACCO NON-USER: CPT | Performed by: NURSE PRACTITIONER

## 2025-03-24 PROCEDURE — G8417 CALC BMI ABV UP PARAM F/U: HCPCS | Performed by: NURSE PRACTITIONER

## 2025-03-24 SDOH — ECONOMIC STABILITY: FOOD INSECURITY: WITHIN THE PAST 12 MONTHS, THE FOOD YOU BOUGHT JUST DIDN'T LAST AND YOU DIDN'T HAVE MONEY TO GET MORE.: NEVER TRUE

## 2025-03-24 SDOH — ECONOMIC STABILITY: FOOD INSECURITY: WITHIN THE PAST 12 MONTHS, YOU WORRIED THAT YOUR FOOD WOULD RUN OUT BEFORE YOU GOT MONEY TO BUY MORE.: NEVER TRUE

## 2025-03-24 SDOH — HEALTH STABILITY: PHYSICAL HEALTH: ON AVERAGE, HOW MANY MINUTES DO YOU ENGAGE IN EXERCISE AT THIS LEVEL?: 30 MIN

## 2025-03-24 SDOH — HEALTH STABILITY: PHYSICAL HEALTH: ON AVERAGE, HOW MANY DAYS PER WEEK DO YOU ENGAGE IN MODERATE TO STRENUOUS EXERCISE (LIKE A BRISK WALK)?: 2 DAYS

## 2025-03-24 ASSESSMENT — ANXIETY QUESTIONNAIRES
2. NOT BEING ABLE TO STOP OR CONTROL WORRYING: NOT AT ALL
7. FEELING AFRAID AS IF SOMETHING AWFUL MIGHT HAPPEN: NOT AT ALL
3. WORRYING TOO MUCH ABOUT DIFFERENT THINGS: NOT AT ALL
6. BECOMING EASILY ANNOYED OR IRRITABLE: NOT AT ALL
IF YOU CHECKED OFF ANY PROBLEMS ON THIS QUESTIONNAIRE, HOW DIFFICULT HAVE THESE PROBLEMS MADE IT FOR YOU TO DO YOUR WORK, TAKE CARE OF THINGS AT HOME, OR GET ALONG WITH OTHER PEOPLE: NOT DIFFICULT AT ALL
5. BEING SO RESTLESS THAT IT IS HARD TO SIT STILL: NOT AT ALL
1. FEELING NERVOUS, ANXIOUS, OR ON EDGE: SEVERAL DAYS
4. TROUBLE RELAXING: NOT AT ALL
GAD7 TOTAL SCORE: 1

## 2025-03-24 ASSESSMENT — ENCOUNTER SYMPTOMS
COUGH: 0
SHORTNESS OF BREATH: 0
WHEEZING: 0
CHEST TIGHTNESS: 0
GASTROINTESTINAL NEGATIVE: 1

## 2025-03-24 ASSESSMENT — PATIENT HEALTH QUESTIONNAIRE - PHQ9
SUM OF ALL RESPONSES TO PHQ QUESTIONS 1-9: 0
SUM OF ALL RESPONSES TO PHQ QUESTIONS 1-9: 0
1. LITTLE INTEREST OR PLEASURE IN DOING THINGS: NOT AT ALL
SUM OF ALL RESPONSES TO PHQ QUESTIONS 1-9: 0
SUM OF ALL RESPONSES TO PHQ QUESTIONS 1-9: 0
2. FEELING DOWN, DEPRESSED OR HOPELESS: NOT AT ALL

## 2025-03-24 NOTE — FLOWSHEET NOTE
Outpatient Physical Therapy  [place]           [x] Phone: [phone]   Fax: [phone]  [place]           [] Phone: [phone]   Fax: [phone]        Physical Therapy Daily Treatment Note  Date:  3/24/2025    Patient Name:  Krissy Lee    :  1965  MRN: [id number]  Restrictions/Precautions: NONE  Diagnosis:   Cervical spondylosis [M47.812]  Radiculopathy affecting upper extremity [M54.10] Diagnosis: cervical radiculopathy  Date of Injury/Surgery: --  Treatment Diagnosis:  Decreased cervical ROM, UE strength, and impaired sensation  Insurance/Certification information: [facility]  Referring Physician:  Silvano Carrillo MD Dr. Zartman   PCP: Neto Marti MD  Next Doctor Visit:  --  Plan of care signed (Y/N):  N, sent 25  Outcome Measure: NDI:   Visit# / total visits:   8/10  Pain level: 0/10 pain  Goals:     Patient goals: improve pain in her neck  Short term goals  Time Frame for Short term goals: 5 weeks  Pt demo I w/ HEP and symptom management reports good compliance   Pt demo NDI score <30% disability to improve tolerance to ADL's and work duties MET  Pt reports 25% improvement in symptoms since starting therapy MET  Pt demo >4/5 BUE strength in all directions without increased pain or symptoms MET  Pt demo WNL cervical flexion without increased pain or symptoms MET    Summary of Evaluation:  Assessment: Patient is a 60 y/o female who arrives to therapy with c/o increased right upper extremity pain approx. 3 weeks ago without injury. The pain is described as shooting pain along the shoulder/shoulder blade that radiates down the arm with tingling in digits 1-3. She had been seeing a chiropractor for 5 visits with little improvements in her symptoms. No imaging has been completed of her cervical spine, x-ray of shoulder displayed no abnormalities. This has affected her day to day life, working as a , and sleeping due to intense and severe pain. She did go to

## 2025-03-24 NOTE — PROGRESS NOTES
Krissy Lee  1965  60 y.o.    SUBJECT HAZEL:    Chief Complaint   Patient presents with    [facility]    Herpes Zoster     Left side shoulder onto chest rash, day 16, and it's dry rash now. Meanwhile, right side shoulder area had piercing pain ans itching about 6 weeks ago. Saw chiropractor who said it was neck pain, even though pt did not complain if neck pain. Sent to PT 4 weeks.       History of Present Illness  The patient is a 60-year-old female who presents to [facility].    She has been experiencing a shingles outbreak, which began on 01/28/2025. The initial episode was characterized by severe pain, itching, and burning sensations, persisting for 5 weeks. Despite three visits to the emergency room, she was informed that her symptoms were not indicative of shingles.     Sixteen days ago, another outbreak occurred on the opposite side, this time without any associated pain. The rash was initially attributed to irritation from a new bra. Throbbing in her finger on right continues to be reported. Chiropractic care was sought, where it was suggested that her symptoms were related to her neck. After six sessions, pain persisted. Consultation with Dr. Carrillo led to the suggestion of physical therapy after reviewing her x-ray, which provided some relief. Percocet from an old prescription was taken, as naproxen was ineffective. During ER visits, two morphine injections and muscle relaxers were administered, neither of which alleviated the pain.     Currently, no pain is experienced. Symptoms included itching, burning, nausea, and headaches. Occasional burning sensations are reported, but no current pain. Significant itching persists, managed with an anti-itch cream. Antiviral medication was prescribed for the rash. No history of auto accidents or injuries is reported. Symptoms may be related to posture at work. No sore throat, difficulty swallowing, or chest pain is reported. During the first ER visit,

## 2025-03-24 NOTE — DISCHARGE SUMMARY
Outpatient Physical Therapy           [place]           [] Phone: [phone]   Fax: [phone]  [place]           [] Phone: [phone]   Fax: [phone]      To: Silvano Carrillo MD     From: Marlena Carcamo PT, PT     Patient: Krissy Lee                    : 1965  Diagnosis:  Cervical spondylosis [M47.812]  Radiculopathy affecting upper extremity [M54.10]        Treatment Diagnosis:       Date: 3/24/2025  []  Progress Note                [x]  Discharge Note    Evaluation Date:  25   Total Visits to date:   8 Cancels/No-shows to date:  1    Subjective:  Tamiko reports she thinks she had shingles and that may have been causing some of her pain. The shingles were on the right side originally, but now on the left side. She did go to the doctor who was not able to confirm it. She has no pain today.     Plan of Care/Treatment to date:  [x] Therapeutic Exercise    [] Modalities:  [x] Therapeutic Activity     [] Ultrasound  [] Electrical Stimulation  [] Gait Training      [] Cervical Traction   [] Lumbar Traction  [x] Neuromuscular Re-education  [] Cold/hotpack [] Iontophoresis  [x] Instruction in HEP      Other:  [x] Manual Therapy       []  Vasopneumatic  [] Aquatic Therapy       []   Dry Needle Therapy                      Objective/Significant Findings At Last Visit/Comments:      Left Strength  Right Strength        L UE Strength except 4+/5 shoudler flexion, 4+/5 shoulder abduction, 5/5 ER, 5/5 IR, 5/5 biceps/triceps       R UE Strength except 4+/5 shoulder flexion, 4+/5 shoulder abduction, 5/5 ER, 4/5 IR, 5/5 biceps, 5/5 triceps             Cervical Assessment   AROM Cervical Spine   Cervical spine general AROM:     Flexion: 35 deg     Extension: 45 deg    R rotation: 75 deg    L rotation:  78 d eg     R side bendin deg    L side bendin deg            Thoracic Assessment    AROM Thoracic Spine   Thoracic spine general AROM: restricted, able to clear ASIS       NDI:

## 2025-06-19 ENCOUNTER — HOSPITAL ENCOUNTER (OUTPATIENT)
Dept: MAMMOGRAPHY | Age: 60
Discharge: HOME | End: 2025-06-19
Payer: COMMERCIAL

## 2025-06-19 DIAGNOSIS — Z12.31 SCREENING MAMMOGRAM, ENCOUNTER FOR: ICD-10-CM

## 2025-06-19 PROCEDURE — 77063 BREAST TOMOSYNTHESIS BI: CPT

## 2025-06-24 ENCOUNTER — APPOINTMENT (OUTPATIENT)
Dept: GENERAL RADIOLOGY | Age: 60
End: 2025-06-24
Payer: COMMERCIAL

## 2025-06-24 ENCOUNTER — HOSPITAL ENCOUNTER (EMERGENCY)
Age: 60
Discharge: HOME | End: 2025-06-24
Payer: COMMERCIAL

## 2025-06-24 VITALS
WEIGHT: 185 LBS | HEART RATE: 57 BPM | BODY MASS INDEX: 30.82 KG/M2 | HEIGHT: 65 IN | DIASTOLIC BLOOD PRESSURE: 56 MMHG | RESPIRATION RATE: 18 BRPM | OXYGEN SATURATION: 96 % | SYSTOLIC BLOOD PRESSURE: 107 MMHG

## 2025-06-24 DIAGNOSIS — S93.402A SPRAIN OF LEFT ANKLE, UNSPECIFIED LIGAMENT, INITIAL ENCOUNTER: Primary | ICD-10-CM

## 2025-06-24 PROCEDURE — 99283 EMERGENCY DEPT VISIT LOW MDM: CPT

## 2025-06-24 PROCEDURE — 73610 X-RAY EXAM OF ANKLE: CPT

## 2025-06-24 ASSESSMENT — PAIN DESCRIPTION - DESCRIPTORS: DESCRIPTORS: ACHING

## 2025-06-24 ASSESSMENT — PAIN SCALES - GENERAL: PAINLEVEL_OUTOF10: 5

## 2025-06-24 ASSESSMENT — PAIN - FUNCTIONAL ASSESSMENT: PAIN_FUNCTIONAL_ASSESSMENT: 0-10

## 2025-06-24 ASSESSMENT — ENCOUNTER SYMPTOMS: BACK PAIN: 0

## 2025-06-24 ASSESSMENT — PAIN DESCRIPTION - LOCATION: LOCATION: ANKLE

## 2025-06-24 ASSESSMENT — PAIN DESCRIPTION - ORIENTATION: ORIENTATION: LEFT

## 2025-06-24 ASSESSMENT — PAIN DESCRIPTION - PAIN TYPE: TYPE: ACUTE PAIN

## 2025-06-24 NOTE — DISCHARGE INSTRUCTIONS
Ankle Pain DC instructions  You have been evaluated in the Emergency Department today for ankle pain. The x-ray of your ankle shows no broken bones. Please rest, ice, and elevate your ankle to control your pain.    You can alternate Tylenol and Motrin every 4-6 hours to help control your pain. For instance, at noon take ibuprofen, then at 3pm take Tylenol, then at 6pm take ibuprofen. If provided, please take any as directed  for breakthrough pain.     Please follow up with your primary care physician as needed. If you do not have a primary doctor, you can call your insurance company to find one.  If you do not have insurance, you can look for one on this paper list or go to the finance/registration department for more assistance.    Return to the Emergency Department if you experience worsening pain, numbness, tingling, change of color in your toes, or any other concerning symptoms. Thank you for choosing us for your care.

## 2025-06-24 NOTE — ED PROVIDER NOTES
(TYLENOL) 325 MG tablet Take 2 tablets by mouth every 6 hours as needed for Pain, Disp-120 tablet, R-3Normal             ALLERGIES     Duloxetine hcl and Tramadol hcl    FAMILYHISTORY       Family History   Problem Relation Age of Onset    Cancer Mother     Arthritis Mother     High Blood Pressure Mother     Cancer Brother     No Known Problems Maternal Grandmother     No Known Problems Maternal Grandfather     No Known Problems Paternal Grandmother     No Known Problems Paternal Grandfather     Breast Cancer Neg Hx     Ovarian Cancer Neg Hx         SOCIAL HISTORY       Social History     Tobacco Use    Smoking status: Never    Smokeless tobacco: Never   Vaping Use    Vaping status: Never Used   Substance Use Topics    Alcohol use: No     Alcohol/week: 0.0 standard drinks of alcohol    Drug use: No       SCREENINGS    [place] Coma Scale  Eye Opening: Spontaneous  Best Verbal Response: Oriented  Best Motor Response: Obeys commands  Yessenia Coma Scale Score: 15      PHYSICAL EXAM:      ED Triage Vitals [06/24/25 1402]   Encounter Vitals Group      BP (!) 101/50      Systolic BP Percentile       Diastolic BP Percentile       Pulse 57      Respirations 18      Temp       Temp src       SpO2 95 %      Weight - Scale 83.9 kg (185 lb)      Height 1.651 m (5' 5\")      Head Circumference       Peak Flow       Pain Score       Pain Loc       Pain Education       Exclude from Growth Chart        Physical Exam    GENERAL APPEARANCE: Awake and alert. Cooperative. No acute distress.  HEAD: Normocephalic. Atraumatic.  NECK: Full ROM  LUNGS: Respirations unlabored.   ABDOMEN: Soft. Non-tender. No guarding or rebound  MUSCULOSKELETAL: No acute deformities.  KNEE/ANKLE/FOOT: left Medial malleolus is tender to palpation. Lateral malleolus is tender to palpation. Navicular is not tender to palpation. 5th metatarsal head is not tender to palpation. Proximal fibula is not tender to palpation. Patella is not tender to palpation. The

## 2025-06-24 NOTE — ED TRIAGE NOTES
Patient tripped on hose, fell, hurt left ankle. Swelling, PMS intact. Was able to initially walk some. EMS gave 100mcg fentanyl prior to arrival.

## 2025-08-12 ENCOUNTER — TELEPHONE (OUTPATIENT)
Dept: FAMILY MEDICINE CLINIC | Age: 60
End: 2025-08-12

## 2025-08-12 DIAGNOSIS — Z63.4 DEATH OF FAMILY MEMBER: ICD-10-CM

## 2025-08-12 DIAGNOSIS — F41.9 ANXIETY: Primary | ICD-10-CM

## 2025-08-12 RX ORDER — LORAZEPAM 1 MG/1
1 TABLET ORAL PRN
Qty: 5 TABLET | Refills: 0 | Status: SHIPPED | OUTPATIENT
Start: 2025-08-12 | End: 2025-08-12 | Stop reason: CLARIF

## 2025-08-12 RX ORDER — LORAZEPAM 1 MG/1
1 TABLET ORAL 2 TIMES DAILY PRN
Qty: 6 TABLET | Refills: 0 | Status: SHIPPED | OUTPATIENT
Start: 2025-08-12 | End: 2025-09-11

## 2025-08-12 SDOH — SOCIAL STABILITY - SOCIAL INSECURITY: DISSAPEARANCE AND DEATH OF FAMILY MEMBER: Z63.4
